# Patient Record
Sex: MALE | Race: WHITE | NOT HISPANIC OR LATINO | Employment: OTHER | URBAN - NONMETROPOLITAN AREA
[De-identification: names, ages, dates, MRNs, and addresses within clinical notes are randomized per-mention and may not be internally consistent; named-entity substitution may affect disease eponyms.]

---

## 2018-02-06 ENCOUNTER — HISTORICAL (OUTPATIENT)
Dept: ADMINISTRATIVE | Facility: HOSPITAL | Age: 63
End: 2018-02-06

## 2018-05-08 ENCOUNTER — HISTORICAL (OUTPATIENT)
Dept: ADMINISTRATIVE | Facility: HOSPITAL | Age: 63
End: 2018-05-08

## 2019-06-05 ENCOUNTER — HISTORICAL (OUTPATIENT)
Dept: ADMINISTRATIVE | Facility: HOSPITAL | Age: 64
End: 2019-06-05

## 2019-07-11 ENCOUNTER — HISTORICAL (OUTPATIENT)
Dept: ADMINISTRATIVE | Facility: HOSPITAL | Age: 64
End: 2019-07-11

## 2019-08-16 ENCOUNTER — HISTORICAL (OUTPATIENT)
Dept: ADMINISTRATIVE | Facility: HOSPITAL | Age: 64
End: 2019-08-16

## 2019-09-08 ENCOUNTER — HISTORICAL (OUTPATIENT)
Dept: ADMINISTRATIVE | Facility: HOSPITAL | Age: 64
End: 2019-09-08

## 2019-09-09 ENCOUNTER — HISTORICAL (OUTPATIENT)
Dept: ADMINISTRATIVE | Facility: HOSPITAL | Age: 64
End: 2019-09-09

## 2020-04-24 ENCOUNTER — HISTORICAL (OUTPATIENT)
Dept: ADMINISTRATIVE | Facility: HOSPITAL | Age: 65
End: 2020-04-24

## 2020-07-23 ENCOUNTER — HISTORICAL (OUTPATIENT)
Dept: ADMINISTRATIVE | Facility: HOSPITAL | Age: 65
End: 2020-07-23

## 2020-07-27 ENCOUNTER — HISTORICAL (OUTPATIENT)
Dept: ADMINISTRATIVE | Facility: HOSPITAL | Age: 65
End: 2020-07-27

## 2020-08-17 ENCOUNTER — HISTORICAL (OUTPATIENT)
Dept: ADMINISTRATIVE | Facility: HOSPITAL | Age: 65
End: 2020-08-17

## 2020-09-18 ENCOUNTER — HISTORICAL (OUTPATIENT)
Dept: ADMINISTRATIVE | Facility: HOSPITAL | Age: 65
End: 2020-09-18

## 2020-09-23 ENCOUNTER — HISTORICAL (OUTPATIENT)
Dept: ADMINISTRATIVE | Facility: HOSPITAL | Age: 65
End: 2020-09-23

## 2021-04-07 ENCOUNTER — HISTORICAL (OUTPATIENT)
Dept: ADMINISTRATIVE | Facility: HOSPITAL | Age: 66
End: 2021-04-07

## 2021-04-07 LAB — D DIMER PPP IA.FEU-MCNC: 1.74 MCG/ML FEU (ref 0–0.5)

## 2022-04-10 ENCOUNTER — HISTORICAL (OUTPATIENT)
Dept: ADMINISTRATIVE | Facility: HOSPITAL | Age: 67
End: 2022-04-10

## 2022-04-26 VITALS
DIASTOLIC BLOOD PRESSURE: 60 MMHG | BODY MASS INDEX: 36.93 KG/M2 | HEIGHT: 69 IN | WEIGHT: 249.31 LBS | OXYGEN SATURATION: 97 % | SYSTOLIC BLOOD PRESSURE: 90 MMHG

## 2022-05-05 DIAGNOSIS — Z80.42 FAMILY HISTORY OF MALIGNANT NEOPLASM OF PROSTATE: Primary | ICD-10-CM

## 2022-05-17 DIAGNOSIS — Z80.42 FAMILY HISTORY OF MALIGNANT NEOPLASM OF PROSTATE: Primary | ICD-10-CM

## 2022-05-18 ENCOUNTER — HOSPITAL ENCOUNTER (OUTPATIENT)
Dept: RADIOLOGY | Facility: HOSPITAL | Age: 67
Discharge: HOME | End: 2022-05-18
Attending: NURSE PRACTITIONER
Payer: MEDICARE

## 2022-05-18 DIAGNOSIS — Z80.42 FAMILY HISTORY OF MALIGNANT NEOPLASM OF PROSTATE: ICD-10-CM

## 2022-05-18 PROCEDURE — 74176 CT ABD & PELVIS W/O CONTRAST: CPT | Mod: TC

## 2023-09-29 DIAGNOSIS — N28.1 SIMPLE RENAL CYST: Primary | ICD-10-CM

## 2023-10-10 ENCOUNTER — HOSPITAL ENCOUNTER (OUTPATIENT)
Dept: RADIOLOGY | Facility: HOSPITAL | Age: 68
Discharge: HOME | End: 2023-10-10
Attending: UROLOGY
Payer: MEDICARE

## 2023-10-10 DIAGNOSIS — N28.1 SIMPLE RENAL CYST: ICD-10-CM

## 2023-10-10 PROCEDURE — 76770 US EXAM ABDO BACK WALL COMP: CPT | Mod: TC

## 2024-02-06 ENCOUNTER — HOSPITAL ENCOUNTER (EMERGENCY)
Facility: HOSPITAL | Age: 69
Discharge: HOME | End: 2024-02-06
Payer: MEDICARE

## 2024-02-06 VITALS
DIASTOLIC BLOOD PRESSURE: 70 MMHG | OXYGEN SATURATION: 95 % | HEIGHT: 69 IN | WEIGHT: 267 LBS | SYSTOLIC BLOOD PRESSURE: 111 MMHG | HEART RATE: 84 BPM | BODY MASS INDEX: 39.55 KG/M2 | RESPIRATION RATE: 22 BRPM | TEMPERATURE: 99 F

## 2024-02-06 DIAGNOSIS — T07.XXXA MULTIPLE ABRASIONS: ICD-10-CM

## 2024-02-06 DIAGNOSIS — T14.90XA TRAUMA: ICD-10-CM

## 2024-02-06 DIAGNOSIS — M43.17 SPONDYLOLISTHESIS OF LUMBOSACRAL REGION: Primary | ICD-10-CM

## 2024-02-06 DIAGNOSIS — S22.080A COMPRESSION FRACTURE OF T12 VERTEBRA, INITIAL ENCOUNTER: ICD-10-CM

## 2024-02-06 PROCEDURE — 25000003 PHARM REV CODE 250: Performed by: NURSE PRACTITIONER

## 2024-02-06 PROCEDURE — 99284 EMERGENCY DEPT VISIT MOD MDM: CPT | Mod: 25

## 2024-02-06 RX ORDER — ACETAMINOPHEN AND CODEINE PHOSPHATE 300; 30 MG/1; MG/1
1 TABLET ORAL EVERY 6 HOURS PRN
Qty: 12 TABLET | Refills: 0 | Status: SHIPPED | OUTPATIENT
Start: 2024-02-06 | End: 2024-02-16

## 2024-02-06 RX ADMIN — BACITRACIN ZINC, NEOMYCIN, POLYMYXIN B SULFAT 1 EACH: 5000; 3.5; 4 OINTMENT TOPICAL at 12:02

## 2024-02-06 NOTE — ED PROVIDER NOTES
Encounter Date: 2/6/2024       History     Chief Complaint   Patient presents with    Fall     Left eye clinic across street from getting eyes dilated. Pt states when he stepped outside the sun hit him and he fell off the curb. Reports lower back pain and abrasions to BL hands. Pt unknown if hit head.      C/o  lower back pain and abrasions to BL hands s/p fall at eye clinic      Review of patient's allergies indicates:   Allergen Reactions    Iodine Other (See Comments)     Kidney failure as a complication of care, urinating blood     No past medical history on file.  No past surgical history on file.  No family history on file.     Review of Systems   Musculoskeletal:  Positive for back pain.   Skin:         Bilateral hand abrasions   All other systems reviewed and are negative.      Physical Exam     Initial Vitals [02/06/24 1042]   BP Pulse Resp Temp SpO2   111/70 84 (!) 22 98.6 °F (37 °C) 95 %      MAP       --         Physical Exam    Nursing note and vitals reviewed.  Constitutional: He appears well-developed and well-nourished.   HENT:   Head: Normocephalic and atraumatic.   Mouth/Throat: Mucous membranes are normal.   Eyes: Pupils are equal, round, and reactive to light.   Neck:   Normal range of motion.  Cardiovascular:  Normal rate and regular rhythm.           Pulmonary/Chest: No respiratory distress.   Musculoskeletal:         General: Normal range of motion.      Cervical back: Normal range of motion.      Comments: Bilateral patella tenderness     Neurological: He is alert and oriented to person, place, and time. He has normal strength.   Skin: Skin is warm and dry. Capillary refill takes less than 2 seconds.   Bilateral hand abrasion proximal palm, no bleeding   Psychiatric: He has a normal mood and affect. His behavior is normal. Judgment and thought content normal.         ED Course   Procedures  Labs Reviewed - No data to display       Imaging Results              X-Ray Knee Complete 4 or More  Views Left (Final result)  Result time 02/06/24 12:49:31   Procedure changed from X-Ray Knee 3 View Left     Final result by Malaika Mayen III, MD (02/06/24 12:49:31)                   Impression:      1. Chronic changes are present as described above.  See above comments.      Electronically signed by: Malaika Mayen  Date:    02/06/2024  Time:    12:49               Narrative:    EXAMINATION:  STUDY: XR KNEE COMP 4 OR MORE VIEWS LEFT    CLINICAL HISTORY AND TECHNIQUE:  Trauma    COMPARISON:  09/18/2020    FINDINGS:  Mild-to-moderate degenerative changes are noted involving all 3 compartments of the left knee and include mild-to-moderate joint space narrowing with mild subchondral sclerosis and hypertrophic spur formation and chondrocalcinosis within the medial and lateral compartments.  I see no definite fractures, dislocations, or other significant abnormalities.                                       X-Ray Knee Complete 4 Or More Views Right (Final result)  Result time 02/06/24 12:48:54   Procedure changed from X-Ray Knee 3 View Right     Final result by Malaika Mayen III, MD (02/06/24 12:48:54)                   Impression:      1. Chronic changes are present as described above.  See above comments.      Electronically signed by: Malaika Mayen  Date:    02/06/2024  Time:    12:48               Narrative:    EXAMINATION:  STUDY: XR KNEE COMP 4 OR MORE VIEWS RIGHT    CLINICAL HISTORY AND TECHNIQUE:  Trauma    COMPARISON:  09/18/2020    FINDINGS:  There is mild demineralization of the skeletal structures with moderate degenerative changes noted involving all 3 compartments of the right knee including moderate joint space narrowing with mild subchondral sclerosis and mild to moderate hypertrophic spur formation with chondrocalcinosis noted within the medial and lateral compartments.  I see no definite fractures, dislocations, or other significant abnormalities.                                       X-Ray Lumbar Spine 2 Or  3 Views (Final result)  Result time 02/06/24 12:48:05      Final result by Malaika Mayen III, MD (02/06/24 12:48:05)                   Impression:      1.  Chronic appearing changes are present as described above in miscellaneous in fractures including what appear to be low-grade, chronic compression fractures with mild anterior wedging of the T12 and L1 vertebral bodies.  If the patient demonstrates exquisite midline tenderness at these levels the possibility of acute progression of old compression fracture should be excluded clinically.  2. A bilateral pars defect (spondylolysis) is noted at the L5 level with significant disc space narrowing and approximately 7-8 mm of anterior spondylolisthesis of L5 on S1.      Electronically signed by: Malaika Mayen  Date:    02/06/2024  Time:    12:48               Narrative:    EXAMINATION:  STUDY:XR LUMBAR SPINE 2 OR 3 VIEWS    CLINICAL HISTORY AND TECHNIQUE:  Patient fell a subsequent lower back pain    COMPARISON:  06/05/2019    FINDINGS:  Miscellaneous: A mild, dextroconvex, scoliotic curvature of the lumbar spine is present.  There is moderate demineralization of the skeletal structures with moderate vertebral body and facet joint spurring noted throughout the lumbar spine.  Moderate disc space narrowing is noted at the L5-S1 level with adjacent endplate sclerosis.    Fractures:  There is mild, chronic appearing compression and anterior wedging of the T12 and L1 vertebral bodies.  Subtle changes suggest a bilateral pars defect at the L5 level.    Alignment: There is 7-8 mm of anterior spondylolisthesis of L5 on S1.    Soft tissue: I see no evidence of focal soft tissue swelling or paravertebral hematomas.                                       Medications   neomycin-bacitracnZn-polymyxnB packet (1 each Topical (Top) Given 2/6/24 1246)     Medical Decision Making  Problems Addressed:  Compression fracture of T12 vertebra, initial encounter: chronic illness or injury  Multiple  abrasions: acute illness or injury  Spondylolisthesis of lumbosacral region: chronic illness or injury  Trauma: acute illness or injury    Amount and/or Complexity of Data Reviewed  Radiology: ordered.    Risk  OTC drugs.  Prescription drug management.                                      Clinical Impression:  Final diagnoses:  [T14.90XA] Trauma  [M43.17] Spondylolisthesis of lumbosacral region (Primary)  [S22.080A] Compression fracture of T12 vertebra, initial encounter  [T07.XXXA] Multiple abrasions          ED Disposition Condition    Discharge Stable          ED Prescriptions       Medication Sig Dispense Start Date End Date Auth. Provider    acetaminophen-codeine 300-30mg (TYLENOL #3) 300-30 mg Tab Take 1 tablet by mouth every 6 (six) hours as needed (pain). 12 tablet 2/6/2024 2/16/2024 Sarah Chery FNP          Follow-up Information       Follow up With Specialties Details Why Contact Info    Petr Vale MD Orthopedic Surgery Call today  [address] [facility] [address] [phone]               Sarah Chery FNP  02/06/24 2167

## 2025-04-23 DIAGNOSIS — S30.1XXA: Primary | ICD-10-CM

## 2025-04-23 DIAGNOSIS — S30.1XXA HEMATOMA OF ABDOMINAL WALL: ICD-10-CM
